# Patient Record
Sex: MALE | Race: WHITE | NOT HISPANIC OR LATINO | URBAN - METROPOLITAN AREA
[De-identification: names, ages, dates, MRNs, and addresses within clinical notes are randomized per-mention and may not be internally consistent; named-entity substitution may affect disease eponyms.]

---

## 2020-03-07 NOTE — PATIENT DISCUSSION
Recommended OBSERVATION and MONITORING for change. Spine appears normal, range of motion is not limited, no muscle or joint tenderness

## 2021-11-04 ENCOUNTER — LASIK CONSULTATION (OUTPATIENT)
Dept: URBAN - METROPOLITAN AREA CLINIC 14 | Facility: CLINIC | Age: 41
End: 2021-11-04

## 2021-11-04 DIAGNOSIS — H52.223: ICD-10-CM

## 2021-11-04 DIAGNOSIS — H52.13: ICD-10-CM

## 2021-11-04 PROCEDURE — 99499LK REFRACTIVE CONSULT/LASIK

## 2021-11-04 ASSESSMENT — KERATOMETRY
OS_K2POWER_DIOPTERS: 45.50
OS_K1POWER_DIOPTERS: 43
OD_K2POWER_DIOPTERS: 44.75
OD_K1POWER_DIOPTERS: 42.75
OS_AXISANGLE2_DEGREES: 88
OD_AXISANGLE2_DEGREES: 96
OD_AXISANGLE_DEGREES: 6
OS_AXISANGLE_DEGREES: 178

## 2021-11-04 ASSESSMENT — PACHYMETRY
OS_CT_UM: 490
OD_CT_UM: 491

## 2022-05-08 NOTE — PATIENT DISCUSSION
NON SMO. Nephrology Progress Note:      Assessment & Plan    1.  Acute renal failure: The patient has CKD: Stage III a  in a renal transplant allograft at baseline.  The patient's creatinine is noted to be rising steadily since 5/6/2022..  Etiology is not immediately apparent.  Uric acid is modestly elevated serum phosphate levels are noted to be rising.  Urine studies from 5/7/2022 was consistent with prerenal azotemia.      - Maintain hemodynamic stability.     -  OK with increasing IV hydration.    -  Continue to monitor hemodynamic status.    2.  Hypertension: Blood pressure control has improved, with some documented hypotensive episodes.  He does have a prior history of poorly controlled hypertension.  He is currently clinically not volume overloaded.    -  Adjust diet antihypertensives to optimize blood pressure control.    3.  Large B-cell lymphoma: Patient has been assessed by the oncology service and plans are in progress to initiate chemotherapy with R-[facility].    4.  Immunosuppression: The patient had been on mycophenolate mofetil/tacrolimus/prednisone in the outpatient setting.    -Tacrolimus dose has been reduced.  Continue to follow levels.    Subjective      Sleeping.  Arousable.  No complaints currently.  Nursing staff report that he is voiding (urine) spontaneously with minimal postvoid residuals.    PMHx, FHx, SHx, and review of systems reviewed and otherwise unchanged.      Patient Active Problem List   Diagnosis   • Diffuse large B cell lymphoma (CMS/HCC)       Objective     I/O's    Intake/Output Summary (Last 24 hours) at 5/8/2022 1248  Last data filed at 5/8/2022 0500  Gross per 24 hour   Intake 2250 ml   Output 1100 ml   Net 1150 ml       Last Recorded Vitals  Blood pressure 127/76, pulse 98, temperature 97.2 °F (36.2 °C), temperature source Oral, resp. rate 17, height 5' 9\" (1.753 m), weight 120.3 kg (265 lb 4.8 oz), SpO2 93 %.  Body mass index is 39.18 kg/m².    Physical Exam   Awake alert oriented x3.   Noted to have snoring respirations.  HEENT: Clear conjunctivae normal pupils moist oral mucosa.  Neck veins are flat.  He has palpable submandibular and upper cervical nodes on the right side of the neck.  No thyromegaly or carotid bruit.  Respiratory: Good breath sounds bilaterally.  No crepitations or rhonchi auscultated currently.  Cardiovascular: Normal S1-S2.  Irregular rhythm.  No pitting pedal edema.  GI: Abdomen is soft with no guarding tenderness or palpable organomegaly.  Urogenital: Normal external genitalia.    Neurologic: Awake and alert.  Appropriate responses.  Musculoskeletal: No joint or skeletal abnormalities noted.  Skin: No rash.  Promonent superficial veins around the right shoulder.    Labs     Recent Labs     05/06/22  1253 05/07/22  0531 05/08/22  0523   SODIUM 137 134* 135   POTASSIUM 3.8 4.7 4.9   CO2 25 23 22   ANIONGAP 12 12 12   GLUCOSE 174* 132* 118*   BUN 44* 54* 68*   CREATININE 1.51* 1.99* 2.33*   BCRAT 29* 27* 29*   CALCIUM 8.7 7.9* 7.9*   BILIRUBIN 0.3 0.3 0.2   AST 6 11 6   GPT 17 16 12   ALKPT 61 57 58   GLOB 4.1* 3.7 3.6   AGR 0.8* 0.8* 0.8*        Recent Labs     05/06/22  0507 05/06/22  1253 05/08/22  0523   WBC 13.4* 13.9* 8.9   RBC 5.02 5.25 4.84   HGB 11.0* 11.3* 10.5*   HCT 35.9* 39.1 35.5*    243 196   MCV 71.5* 74.5* 73.3*   MCH 21.9* 21.5* 21.7*   MCHC 30.6* 28.9* 29.6*   NRBCRE 0 0 0         Imaging          Irene Almanzar MD